# Patient Record
Sex: FEMALE | Race: WHITE | NOT HISPANIC OR LATINO | URBAN - METROPOLITAN AREA
[De-identification: names, ages, dates, MRNs, and addresses within clinical notes are randomized per-mention and may not be internally consistent; named-entity substitution may affect disease eponyms.]

---

## 2017-06-01 ENCOUNTER — OFFICE VISIT (OUTPATIENT)
Dept: FAMILY MEDICINE CLINIC | Facility: CLINIC | Age: 22
End: 2017-06-01

## 2017-06-01 VITALS
TEMPERATURE: 98.5 F | SYSTOLIC BLOOD PRESSURE: 118 MMHG | DIASTOLIC BLOOD PRESSURE: 70 MMHG | WEIGHT: 144.2 LBS | HEART RATE: 72 BPM | BODY MASS INDEX: 26.54 KG/M2 | HEIGHT: 62 IN | RESPIRATION RATE: 16 BRPM

## 2017-06-01 DIAGNOSIS — Z00.00 ANNUAL PHYSICAL EXAM: Primary | ICD-10-CM

## 2017-06-01 PROCEDURE — 99395 PREV VISIT EST AGE 18-39: CPT | Performed by: NURSE PRACTITIONER

## 2017-06-01 RX ORDER — ACETAMINOPHEN AND CODEINE PHOSPHATE 120; 12 MG/5ML; MG/5ML
1 SOLUTION ORAL DAILY
Qty: 28 TABLET | Refills: 12 | Status: SHIPPED | OUTPATIENT
Start: 2017-06-01 | End: 2018-02-26 | Stop reason: ALTCHOICE

## 2017-06-01 NOTE — PROGRESS NOTES
Subjective   Rosa Cristina is a 22 y.o. female.     History of Present Illness   Here for annual PE and pap   Wants to discuss OCP options  Did not tolerate the previous OCP felt crazy, did not know if it was the Apri or the Fluoxetine that she was on for her depression and anxiety. She stopped them both. She would like to be considered for progesterone only pill. She has discussed it with some of her friends who tolerate this kind of birth control without difficulty.  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 She is riding horses currently.  maintaining weight  Getting restful sleep    The following portions of the patient's history were reviewed and updated as appropriate: allergies, current medications, past family history, past medical history, past social history, past surgical history and problem list.    Review of Systems   Constitutional: Positive for activity change. Negative for appetite change, fatigue and unexpected weight change.   Eyes: Negative.    Respiratory: Negative.    Cardiovascular: Negative.    Gastrointestinal: Negative.    Endocrine: Negative.    Genitourinary: Negative.    Musculoskeletal: Negative.    Skin: Negative.    Allergic/Immunologic: Negative.    Neurological: Positive for headaches (occasional HA; takes Excedrin Migraine med).   Hematological: Negative.    Psychiatric/Behavioral: Positive for dysphoric mood. Negative for self-injury, sleep disturbance and suicidal ideas. The patient is nervous/anxious.        Objective   Physical Exam   Constitutional: She is oriented to person, place, and time. Vital signs are normal. She appears well-developed and well-nourished. No distress.   HENT:   Head: Normocephalic.   Right Ear: Tympanic membrane, external ear and ear canal normal.   Left Ear: Tympanic membrane, external ear and ear canal normal.    Nose: Nose normal. No mucosal edema or rhinorrhea.   Mouth/Throat: Uvula is midline, oropharynx is clear and moist and mucous membranes are normal. Normal dentition.   Eyes: Conjunctivae and lids are normal. Pupils are equal, round, and reactive to light.   Neck: Trachea normal and normal range of motion. Neck supple. No JVD present. Carotid bruit is not present. No thyroid mass and no thyromegaly present.   Cardiovascular: Normal rate, regular rhythm, S1 normal, S2 normal, normal heart sounds and intact distal pulses.    Pulmonary/Chest: Effort normal and breath sounds normal. Right breast exhibits no inverted nipple, no mass, no nipple discharge, no skin change and no tenderness. Left breast exhibits no inverted nipple, no mass, no nipple discharge, no skin change and no tenderness.   Abdominal: Soft. Normal appearance and bowel sounds are normal.   Genitourinary: Vagina normal and uterus normal. Cervix exhibits no motion tenderness and no friability. Right adnexum displays no mass, no tenderness and no fullness. Left adnexum displays no mass, no tenderness and no fullness. No tenderness or bleeding in the vagina. No vaginal discharge found.   Musculoskeletal: Normal range of motion. She exhibits edema (mild at left ankle (previous fracture)).   Lymphadenopathy:        Head (right side): No tonsillar, no preauricular, no posterior auricular and no occipital adenopathy present.        Head (left side): No tonsillar, no preauricular, no posterior auricular and no occipital adenopathy present.     She has no cervical adenopathy.   Neurological: She is alert and oriented to person, place, and time. She has normal reflexes.   Skin: Skin is warm, dry and intact. No rash noted. No cyanosis. Nails show no clubbing.   Psychiatric: She has a normal mood and affect. Her speech is normal and behavior is normal. Judgment and thought content normal. Cognition and memory are normal.   Nursing note and vitals  reviewed.      Assessment/Plan   Rosa was seen today for physical and pap.    Diagnoses and all orders for this visit:    Annual physical exam    Other orders  -     norethindrone (MICRONOR) 0.35 MG tablet; Take 1 tablet by mouth Daily.        Will send swabs to [facility] for genetic testing for ADHD, psych and analgesic testing; discussed pricing with pt, she is agreeable to pay any out of pocket expense. Will contact her once results are available. Will discuss possible treatment for depression and anxiety if needed this will assist in choosing tx.   Will start pt on Norethindrone OCP to see if she tolerates this better than previous Apri.  F/U prn

## 2018-02-26 ENCOUNTER — OFFICE VISIT (OUTPATIENT)
Dept: FAMILY MEDICINE CLINIC | Facility: CLINIC | Age: 23
End: 2018-02-26

## 2018-02-26 VITALS
RESPIRATION RATE: 20 BRPM | SYSTOLIC BLOOD PRESSURE: 104 MMHG | HEART RATE: 72 BPM | TEMPERATURE: 98.5 F | BODY MASS INDEX: 25.47 KG/M2 | DIASTOLIC BLOOD PRESSURE: 70 MMHG | HEIGHT: 62 IN | WEIGHT: 138.4 LBS

## 2018-02-26 DIAGNOSIS — Z11.1 TUBERCULOSIS SCREENING: ICD-10-CM

## 2018-02-26 DIAGNOSIS — Z02.89 ENCOUNTER FOR PHYSICAL EXAMINATION RELATED TO EMPLOYMENT: Primary | ICD-10-CM

## 2018-02-26 PROCEDURE — 99213 OFFICE O/P EST LOW 20 MIN: CPT | Performed by: FAMILY MEDICINE

## 2018-02-26 PROCEDURE — 86580 TB INTRADERMAL TEST: CPT | Performed by: FAMILY MEDICINE

## 2018-02-26 NOTE — PATIENT INSTRUCTIONS
Go to the nearest ER or return to clinic if symptoms worsen, fever/chill develop    Tuberculin Skin Test  Why am I having this test?  Tuberculosis (TB) is a bacterial infection caused by Mycobacterium tuberculosis. Most people who are exposed to these bacteria have a strong enough defense (immune) system to prevent the bacteria from causing TB and developing symptoms. Their bodies prevent the germs from being active and making them sick (latent TB infection).  However, if you have TB germs in your body and your immune system is weak, you can develop a TB infection. This can cause symptoms such as:  · Night sweats.  · Fever.  · Weakness.  · Weight loss.  A latent TB infection can also become active later in life if your immune system becomes weakened or compromised.  You may have this test if your health care provider suspects that you have TB. You may also have this test to screen for TB if you are at risk for getting the disease. Those at increased risk include:  · People who inject illegal drugs or share needles.  · People with HIV or other diseases that affect immunity.  · Health care workers.  · People who live in high-risk communities, such as homeless shelters, nursing homes, and correctional facilities.  · People who have been in contact with someone with TB.  · People from countries where TB is more common.  If you are in a high-risk group, your health care provider may wish to screen for TB more often. This can help prevent the spread of the disease. Sometimes TB screening is required when starting a new job, such as becoming a health care worker or a teacher. Colleges or universities may require it of new students.  What is being tested?  A tuberculin skin test is the main test used to check for exposure to the bacteria that can cause TB. The test checks for antibodies to the bacteria. Antibodies are proteins that your body produces to protect you from germs and other things that can make you sick.  Your  health care provider will inject a solution known as PPD (purified protein derivative) under the first layer of skin on your arm. This causes a blister-like bubble to form at the site. Your health care provider will then examine the site after a number of hours have passed to see if a reaction has occurred.  How do I prepare for this test?  There is no preparation required for this test.  What do the results mean?  Your test results will be reported as either negative or positive.  If the tuberculin skin test produces a negative result, it is likely that you do not have TB and have not been exposed to the TB bacteria.  If you or your health care provider suspects exposure, however, you may want to repeat the test a few weeks later. A blood test may also be used to check for TB. This is because you will not react to the tuberculin skin test until several weeks after exposure to TB bacteria.  If you test positive to the tuberculin skin test, it is likely that you have been exposed to TB bacteria. The test does not distinguish between an active and a latent TB infection.  A false-positive result can occur. A false-positive result for TB bacteria is incorrect because it indicates a condition or finding is present when it is not.  Talk to your health care provider to discuss your results, treatment options, and if necessary, the need for more tests.  It is your responsibility to obtain your test results. Ask the lab or department performing the test when and how you will get your results. Talk with your health care provider if you have any questions about your results.  Talk with your health care provider to discuss your results, treatment options, and if necessary, the need for more tests. Talk with your health care provider if you have any questions about your results.  This information is not intended to replace advice given to you by your health care provider. Make sure you discuss any questions you have with your  health care provider.  Document Released: 09/27/2006 Document Revised: 08/20/2017 Document Reviewed: 04/13/2015  [employer] Interactive Patient Education © 2017 [employer]

## 2018-02-26 NOTE — PROGRESS NOTES
Subjective   Rosa Cristina is a 22 y.o. female.     History of Present Illness   She is here for employee physical.   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   No acute complaints today.     The following portions of the patient's history were reviewed and updated as appropriate: allergies, current medications, past family history, past medical history, past social history, past surgical history and problem list.    Review of Systems   Constitutional: Negative for chills, fatigue and fever.   HENT: Negative.    Eyes: Negative.    Respiratory: Negative for cough and shortness of breath.    Cardiovascular: Negative for chest pain and palpitations.   Gastrointestinal: Negative for abdominal pain.   Endocrine: Negative.    Musculoskeletal: Negative for gait problem.   Skin: Negative for rash.   Allergic/Immunologic: Negative for environmental allergies and food allergies.   Neurological: Negative for weakness and headaches.   Hematological: Negative for adenopathy.   Psychiatric/Behavioral: Negative for agitation, confusion and sleep disturbance.       Objective   Physical Exam   Constitutional: She is oriented to person, place, and time. She appears well-developed and well-nourished.   HENT:   Head: Normocephalic and atraumatic.   Right Ear: Hearing, tympanic membrane, external ear and ear canal normal.   Left Ear: Hearing, tympanic membrane, external ear and ear canal normal.   Nose: Nose normal.   Mouth/Throat: Uvula is midline, oropharynx is clear and moist and mucous membranes are normal.   Eyes: Conjunctivae and EOM are normal.   Neck: Normal range of motion. Neck supple. No thyromegaly present.   Cardiovascular: Normal rate, regular rhythm and normal heart sounds.    No murmur heard.  Pulmonary/Chest: Effort normal and breath sounds normal. She has no wheezes.   Abdominal: Soft. Bowel sounds are normal.   Musculoskeletal: She exhibits no edema or deformity.   Lymphadenopathy:     She has no cervical  adenopathy.   Neurological: She is alert and oriented to person, place, and time. No cranial nerve deficit.   Skin: Skin is warm and dry.   Psychiatric: She has a normal mood and affect. Her behavior is normal. Judgment and thought content normal.   Nursing note and vitals reviewed.      Assessment/Plan   Rosa was seen today for employment physical.    Diagnoses and all orders for this visit:    Encounter for physical examination related to employment    Tuberculosis screening  -     TB Skin Test      Normal physical exam   TB skin test administered today, she will return in 48 hours to have it read  Form completed, can  when she returns.

## 2018-02-28 LAB
INDURATION: 0 MM (ref 0–10)
TB SKIN TEST: NEGATIVE

## 2018-07-11 ENCOUNTER — LAB REQUISITION (OUTPATIENT)
Dept: LAB | Facility: HOSPITAL | Age: 23
End: 2018-07-11

## 2018-07-11 DIAGNOSIS — Z00.00 ROUTINE GENERAL MEDICAL EXAMINATION AT A HEALTH CARE FACILITY: ICD-10-CM

## 2018-07-11 PROCEDURE — 36415 COLL VENOUS BLD VENIPUNCTURE: CPT | Performed by: NURSE PRACTITIONER

## 2019-10-04 ENCOUNTER — OFFICE VISIT (OUTPATIENT)
Dept: FAMILY MEDICINE CLINIC | Facility: CLINIC | Age: 24
End: 2019-10-04

## 2019-10-04 VITALS
BODY MASS INDEX: 26.16 KG/M2 | SYSTOLIC BLOOD PRESSURE: 126 MMHG | WEIGHT: 143 LBS | TEMPERATURE: 98.6 F | DIASTOLIC BLOOD PRESSURE: 88 MMHG | HEART RATE: 86 BPM

## 2019-10-04 DIAGNOSIS — L70.0 ACNE VULGARIS: Primary | ICD-10-CM

## 2019-10-04 PROCEDURE — 99213 OFFICE O/P EST LOW 20 MIN: CPT | Performed by: NURSE PRACTITIONER

## 2019-10-04 RX ORDER — TRETINOIN 0.5 MG/G
CREAM TOPICAL NIGHTLY PRN
COMMUNITY

## 2019-10-04 RX ORDER — CLINDAMYCIN PHOSPHATE 10 MG/G
GEL TOPICAL 2 TIMES DAILY
Qty: 60 G | Refills: 1 | Status: SHIPPED | OUTPATIENT
Start: 2019-10-04

## 2019-10-04 NOTE — PROGRESS NOTES
Subjective   Rosa Crsitina is a 24 y.o. female.     History of Present Illness   Facial acne  Going to  and having chemical peels and using topical Retin A   Took Acutane in HS which really helped acne, also took oral antibiotics which has not competely helped with acne  She is requesting antibiotic to see if this will cure it  She is under more stress currently with school  Drinks plenty of water  Faithful to wash face twice day  Tries to keep from touching face  Healthy food choices    The following portions of the patient's history were reviewed and updated as appropriate: allergies, current medications, past family history, past medical history, past social history, past surgical history and problem list.    Review of Systems   Constitutional: Negative.    HENT: Negative.    Cardiovascular: Negative.    Gastrointestinal: Negative.    Musculoskeletal: Negative.    Skin: Positive for skin lesions (acne).   Psychiatric/Behavioral: Negative.  Positive for stress. Negative for sleep disturbance and depressed mood. The patient is not nervous/anxious.        Objective   Physical Exam   Constitutional: She is oriented to person, place, and time. She appears well-developed and well-nourished. No distress.   HENT:   Head: Normocephalic.   Cardiovascular: Normal rate, regular rhythm and normal heart sounds.   Pulmonary/Chest: Effort normal and breath sounds normal.   Neurological: She is alert and oriented to person, place, and time.   Skin: Skin is warm and dry. Capillary refill takes less than 2 seconds. She is not diaphoretic. There is erythema.   Moderate acne on lower chin area of face   Nursing note and vitals reviewed.        Assessment/Plan   Rosa was seen today for breaking out.    Diagnoses and all orders for this visit:    Acne vulgaris  -     clindamycin (CLINDAGEL) 1 % gel; Apply  topically to the appropriate area as directed 2 (Two) Times a Day.      Would like pt to work on building up  microbiome gut dionna so that if the topical antibiotics do not work can give oral antibiotics without too much disruption of gut dionna. Pt agrees   She will give the topical Clindagel 4 weeks but if not improving pt will call back.